# Patient Record
Sex: MALE | Race: WHITE | NOT HISPANIC OR LATINO | URBAN - METROPOLITAN AREA
[De-identification: names, ages, dates, MRNs, and addresses within clinical notes are randomized per-mention and may not be internally consistent; named-entity substitution may affect disease eponyms.]

---

## 2020-10-09 ENCOUNTER — OFFICE VISIT (OUTPATIENT)
Dept: URBAN - METROPOLITAN AREA CLINIC 63 | Facility: CLINIC | Age: 65
End: 2020-10-09

## 2020-12-17 ENCOUNTER — OFFICE VISIT (OUTPATIENT)
Dept: URBAN - METROPOLITAN AREA SURGERY CENTER 4 | Facility: SURGERY CENTER | Age: 65
End: 2020-12-17

## 2020-12-18 LAB — PATHOLOGY (INDENTED REPORT): (no result)

## 2022-06-09 ENCOUNTER — NEW PATIENT (OUTPATIENT)
Dept: URBAN - METROPOLITAN AREA CLINIC 26 | Facility: CLINIC | Age: 67
End: 2022-06-09

## 2022-06-09 VITALS
BODY MASS INDEX: 24.38 KG/M2 | SYSTOLIC BLOOD PRESSURE: 143 MMHG | WEIGHT: 180 LBS | DIASTOLIC BLOOD PRESSURE: 94 MMHG | HEIGHT: 72 IN | HEART RATE: 64 BPM

## 2022-06-09 DIAGNOSIS — H43.811: ICD-10-CM

## 2022-06-09 DIAGNOSIS — H33.011: ICD-10-CM

## 2022-06-09 DIAGNOSIS — H33.302: ICD-10-CM

## 2022-06-09 PROCEDURE — 92201 OPSCPY EXTND RTA DRAW UNI/BI: CPT

## 2022-06-09 PROCEDURE — 99204 OFFICE O/P NEW MOD 45 MIN: CPT

## 2022-06-09 PROCEDURE — 67110 REPAIR DETACHED RETINA: CPT

## 2022-06-09 ASSESSMENT — TONOMETRY
OS_IOP_MMHG: 12
OD_IOP_MMHG: 11

## 2022-06-09 ASSESSMENT — VISUAL ACUITY
OD_SC: 20/20-1
OS_SC: 20/20-2

## 2022-06-10 ENCOUNTER — POST-OP (OUTPATIENT)
Dept: URBAN - METROPOLITAN AREA CLINIC 26 | Facility: CLINIC | Age: 67
End: 2022-06-10

## 2022-06-10 DIAGNOSIS — H33.302: ICD-10-CM

## 2022-06-10 DIAGNOSIS — H33.011: ICD-10-CM

## 2022-06-10 DIAGNOSIS — H43.811: ICD-10-CM

## 2022-06-10 PROCEDURE — 99024 POSTOP FOLLOW-UP VISIT: CPT

## 2022-06-10 RX ORDER — PREDNISOLONE ACETATE 10 MG/ML: 1 SUSPENSION/ DROPS OPHTHALMIC

## 2022-06-10 RX ORDER — TOBRAMYCIN 3 MG/ML: 1 SOLUTION/ DROPS OPHTHALMIC

## 2022-06-10 ASSESSMENT — TONOMETRY
OS_IOP_MMHG: 16
OD_IOP_MMHG: 13

## 2022-06-10 ASSESSMENT — VISUAL ACUITY
OS_SC: 20/20-1
OD_SC: 20/40+1

## 2022-06-16 ENCOUNTER — POST-OP (OUTPATIENT)
Dept: URBAN - METROPOLITAN AREA CLINIC 26 | Facility: CLINIC | Age: 67
End: 2022-06-16

## 2022-06-16 DIAGNOSIS — H33.011: ICD-10-CM

## 2022-06-16 DIAGNOSIS — H33.302: ICD-10-CM

## 2022-06-16 DIAGNOSIS — H43.811: ICD-10-CM

## 2022-06-16 DIAGNOSIS — Z98.890: ICD-10-CM

## 2022-06-16 PROCEDURE — 67105 REPAIR DETACHED RETINA PC: CPT

## 2022-06-16 PROCEDURE — 92250 FUNDUS PHOTOGRAPHY W/I&R: CPT

## 2022-06-16 PROCEDURE — 99024 POSTOP FOLLOW-UP VISIT: CPT

## 2022-06-16 ASSESSMENT — VISUAL ACUITY
OD_SC: 20/20
OS_SC: 20/20-1

## 2022-06-16 ASSESSMENT — TONOMETRY
OD_IOP_MMHG: 13
OS_IOP_MMHG: 15

## 2022-06-23 ENCOUNTER — POST-OP (OUTPATIENT)
Dept: URBAN - METROPOLITAN AREA CLINIC 26 | Facility: CLINIC | Age: 67
End: 2022-06-23

## 2022-06-23 DIAGNOSIS — H43.811: ICD-10-CM

## 2022-06-23 DIAGNOSIS — H33.011: ICD-10-CM

## 2022-06-23 PROCEDURE — 92250 FUNDUS PHOTOGRAPHY W/I&R: CPT

## 2022-06-23 PROCEDURE — 99024 POSTOP FOLLOW-UP VISIT: CPT

## 2022-06-23 ASSESSMENT — TONOMETRY
OD_IOP_MMHG: 14
OS_IOP_MMHG: 15

## 2022-06-23 ASSESSMENT — VISUAL ACUITY
OD_SC: 20/20
OS_SC: 20/20-1

## 2022-07-01 ENCOUNTER — POST-OP (OUTPATIENT)
Dept: URBAN - METROPOLITAN AREA CLINIC 26 | Facility: CLINIC | Age: 67
End: 2022-07-01

## 2022-07-01 DIAGNOSIS — H33.011: ICD-10-CM

## 2022-07-01 DIAGNOSIS — H43.811: ICD-10-CM

## 2022-07-01 DIAGNOSIS — H33.302: ICD-10-CM

## 2022-07-01 PROCEDURE — 99024 POSTOP FOLLOW-UP VISIT: CPT

## 2022-07-01 PROCEDURE — 67105 REPAIR DETACHED RETINA PC: CPT

## 2022-07-01 ASSESSMENT — VISUAL ACUITY: OD_SC: 20/30-1

## 2022-07-09 ENCOUNTER — TELEPHONE ENCOUNTER (OUTPATIENT)
Dept: URBAN - METROPOLITAN AREA CLINIC 121 | Facility: CLINIC | Age: 67
End: 2022-07-09

## 2022-07-10 ENCOUNTER — TELEPHONE ENCOUNTER (OUTPATIENT)
Dept: URBAN - METROPOLITAN AREA CLINIC 121 | Facility: CLINIC | Age: 67
End: 2022-07-10

## 2022-07-10 RX ORDER — OMEPRAZOLE 20 MG/1
CAPSULE, DELAYED RELEASE ORAL
Refills: 0 | Status: ACTIVE | COMMUNITY
Start: 2020-10-09

## 2022-07-15 ENCOUNTER — POST-OP (OUTPATIENT)
Dept: URBAN - METROPOLITAN AREA CLINIC 26 | Facility: CLINIC | Age: 67
End: 2022-07-15

## 2022-07-15 DIAGNOSIS — H33.011: ICD-10-CM

## 2022-07-15 DIAGNOSIS — H33.302: ICD-10-CM

## 2022-07-15 DIAGNOSIS — Z98.890: ICD-10-CM

## 2022-07-15 PROCEDURE — 92250 FUNDUS PHOTOGRAPHY W/I&R: CPT

## 2022-07-15 PROCEDURE — 99024 POSTOP FOLLOW-UP VISIT: CPT

## 2022-07-15 ASSESSMENT — TONOMETRY
OD_IOP_MMHG: 14
OS_IOP_MMHG: 14

## 2022-07-15 ASSESSMENT — VISUAL ACUITY
OS_SC: 20/20+2
OD_SC: 20/25+2

## 2022-10-14 ENCOUNTER — COMPREHENSIVE EXAM (OUTPATIENT)
Dept: URBAN - METROPOLITAN AREA CLINIC 26 | Facility: CLINIC | Age: 67
End: 2022-10-14

## 2022-10-14 DIAGNOSIS — H33.302: ICD-10-CM

## 2022-10-14 DIAGNOSIS — H33.011: ICD-10-CM

## 2022-10-14 DIAGNOSIS — H43.811: ICD-10-CM

## 2022-10-14 PROCEDURE — 92014 COMPRE OPH EXAM EST PT 1/>: CPT

## 2022-10-14 PROCEDURE — 92250 FUNDUS PHOTOGRAPHY W/I&R: CPT

## 2022-10-14 ASSESSMENT — VISUAL ACUITY
OS_SC: 20/20
OD_SC: 20/20-1

## 2022-10-14 ASSESSMENT — TONOMETRY
OD_IOP_MMHG: 12
OS_IOP_MMHG: 13

## 2023-04-20 ENCOUNTER — COMPREHENSIVE EXAM (OUTPATIENT)
Dept: URBAN - METROPOLITAN AREA CLINIC 26 | Facility: CLINIC | Age: 68
End: 2023-04-20

## 2023-04-20 VITALS — BODY MASS INDEX: 25.06 KG/M2 | WEIGHT: 185 LBS | HEIGHT: 72 IN

## 2023-04-20 DIAGNOSIS — H33.302: ICD-10-CM

## 2023-04-20 DIAGNOSIS — H33.011: ICD-10-CM

## 2023-04-20 DIAGNOSIS — H43.811: ICD-10-CM

## 2023-04-20 PROCEDURE — 92014 COMPRE OPH EXAM EST PT 1/>: CPT

## 2023-04-20 PROCEDURE — 92250 FUNDUS PHOTOGRAPHY W/I&R: CPT

## 2023-04-20 ASSESSMENT — TONOMETRY
OS_IOP_MMHG: 16
OD_IOP_MMHG: 14

## 2023-04-20 ASSESSMENT — VISUAL ACUITY
OD_SC: 20/20+1
OS_SC: 20/20

## 2023-09-05 ENCOUNTER — WEB ENCOUNTER (OUTPATIENT)
Dept: URBAN - METROPOLITAN AREA CLINIC 63 | Facility: CLINIC | Age: 68
End: 2023-09-05

## 2023-09-08 ENCOUNTER — DASHBOARD ENCOUNTERS (OUTPATIENT)
Age: 68
End: 2023-09-08

## 2023-09-08 ENCOUNTER — OFFICE VISIT (OUTPATIENT)
Dept: URBAN - METROPOLITAN AREA CLINIC 63 | Facility: CLINIC | Age: 68
End: 2023-09-08
Payer: MEDICARE

## 2023-09-08 VITALS
BODY MASS INDEX: 25.65 KG/M2 | HEART RATE: 65 BPM | DIASTOLIC BLOOD PRESSURE: 80 MMHG | HEIGHT: 72 IN | TEMPERATURE: 97 F | WEIGHT: 189.4 LBS | OXYGEN SATURATION: 95 % | SYSTOLIC BLOOD PRESSURE: 130 MMHG

## 2023-09-08 DIAGNOSIS — Z12.11 COLON CANCER SCREENING: ICD-10-CM

## 2023-09-08 DIAGNOSIS — K22.70 BARRETT'S ESOPHAGUS WITHOUT DYSPLASIA: ICD-10-CM

## 2023-09-08 PROBLEM — 302914006: Status: ACTIVE | Noted: 2023-09-08

## 2023-09-08 PROCEDURE — 99203 OFFICE O/P NEW LOW 30 MIN: CPT | Performed by: NURSE PRACTITIONER

## 2023-09-08 RX ORDER — ONDANSETRON HYDROCHLORIDE 4 MG/1
1 TABLET TABLET, FILM COATED ORAL
Qty: 2 | Refills: 0 | OUTPATIENT
Start: 2023-09-08

## 2023-09-08 RX ORDER — OMEPRAZOLE 20 MG/1
CAPSULE, DELAYED RELEASE ORAL
Refills: 0 | Status: ACTIVE | COMMUNITY
Start: 2020-10-09

## 2023-09-08 NOTE — PHYSICAL EXAM HENT:
Head,  normocephalic,  atraumatic,  Face,  Face within normal limits,  Ears,  External ears within normal limits,
no

## 2023-09-08 NOTE — HPI-TODAY'S VISIT:
Having a bm 2 times a day. Just started having abdominal cramps and anal discomfort, which seems to be improving.  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 Denies COPD, asthma or sleep apnea. Denies heartburn, nausea vomiting or dysphagia.

## 2023-09-15 ENCOUNTER — LAB OUTSIDE AN ENCOUNTER (OUTPATIENT)
Dept: URBAN - METROPOLITAN AREA CLINIC 63 | Facility: CLINIC | Age: 68
End: 2023-09-15

## 2023-09-27 ENCOUNTER — OUT OF OFFICE VISIT (OUTPATIENT)
Dept: URBAN - METROPOLITAN AREA SURGERY CENTER 4 | Facility: SURGERY CENTER | Age: 68
End: 2023-09-27
Payer: MEDICARE

## 2023-09-27 ENCOUNTER — CLAIMS CREATED FROM THE CLAIM WINDOW (OUTPATIENT)
Dept: URBAN - METROPOLITAN AREA CLINIC 4 | Facility: CLINIC | Age: 68
End: 2023-09-27
Payer: MEDICARE

## 2023-09-27 DIAGNOSIS — Z12.11 COLON CANCER SCREENING (HIGH RISK): ICD-10-CM

## 2023-09-27 DIAGNOSIS — Z12.11 COLON CANCER SCREENING: ICD-10-CM

## 2023-09-27 DIAGNOSIS — K57.30 DIVERTCULOSIS OF LG INT W/O PERFORATION OR ABSCESS W/O BLEEDING: ICD-10-CM

## 2023-09-27 DIAGNOSIS — K44.9 DIAPHRAGMATIC HERNIA WITHOUT OBSTRUCTION OR GANGRENE: ICD-10-CM

## 2023-09-27 DIAGNOSIS — K62.89 OTHER SPECIFIED DISEASES OF ANUS AND RECTUM: ICD-10-CM

## 2023-09-27 DIAGNOSIS — K57.30 DIVERTICULA OF COLON: ICD-10-CM

## 2023-09-27 DIAGNOSIS — K44.9 HIATAL HERNIA: ICD-10-CM

## 2023-09-27 DIAGNOSIS — K64.1 GRADE II HEMORRHOIDS: ICD-10-CM

## 2023-09-27 DIAGNOSIS — K22.70 BARRETT'S ESOPHAGUS WITHOUT DYSPLASIA: ICD-10-CM

## 2023-09-27 DIAGNOSIS — K22.719 BARRETT'S ESOPHAGUS WITH DYSPLASIA, UNSPECIFIED: ICD-10-CM

## 2023-09-27 PROCEDURE — G0121 COLON CA SCRN NOT HI RSK IND: HCPCS | Performed by: INTERNAL MEDICINE

## 2023-09-27 PROCEDURE — 43239 EGD BIOPSY SINGLE/MULTIPLE: CPT | Performed by: INTERNAL MEDICINE

## 2023-09-27 PROCEDURE — 00813 ANES UPR LWR GI NDSC PX: CPT | Performed by: NURSE ANESTHETIST, CERTIFIED REGISTERED

## 2023-09-27 PROCEDURE — 43239 EGD BIOPSY SINGLE/MULTIPLE: CPT | Performed by: CLINIC/CENTER

## 2023-09-27 PROCEDURE — 88305 TISSUE EXAM BY PATHOLOGIST: CPT | Performed by: PATHOLOGY

## 2023-09-27 PROCEDURE — G0121 COLON CA SCRN NOT HI RSK IND: HCPCS | Performed by: CLINIC/CENTER

## 2023-09-27 RX ORDER — OMEPRAZOLE 20 MG/1
CAPSULE, DELAYED RELEASE ORAL
Refills: 0 | Status: ACTIVE | COMMUNITY
Start: 2020-10-09

## 2023-09-27 RX ORDER — ONDANSETRON HYDROCHLORIDE 4 MG/1
1 TABLET TABLET, FILM COATED ORAL
Qty: 2 | Refills: 0 | Status: ACTIVE | COMMUNITY
Start: 2023-09-08